# Patient Record
Sex: FEMALE | URBAN - METROPOLITAN AREA
[De-identification: names, ages, dates, MRNs, and addresses within clinical notes are randomized per-mention and may not be internally consistent; named-entity substitution may affect disease eponyms.]

---

## 2020-11-20 ENCOUNTER — APPOINTMENT (RX ONLY)
Dept: URBAN - METROPOLITAN AREA CLINIC 56 | Facility: CLINIC | Age: 30
Setting detail: DERMATOLOGY
End: 2020-11-20

## 2020-11-20 DIAGNOSIS — L74.51 PRIMARY FOCAL HYPERHIDROSIS: ICD-10-CM

## 2020-11-20 PROBLEM — L74.510 PRIMARY FOCAL HYPERHIDROSIS, AXILLA: Status: ACTIVE | Noted: 2020-11-20

## 2020-11-20 PROCEDURE — ? BOTOX HYPERHIDROSIS THERAPY

## 2020-11-20 PROCEDURE — 64650 CHEMODENERV ECCRINE GLANDS: CPT

## 2020-11-20 ASSESSMENT — LOCATION DETAILED DESCRIPTION DERM
LOCATION DETAILED: RIGHT AXILLARY VAULT
LOCATION DETAILED: LEFT AXILLARY VAULT

## 2020-11-20 ASSESSMENT — LOCATION SIMPLE DESCRIPTION DERM
LOCATION SIMPLE: LEFT AXILLARY VAULT
LOCATION SIMPLE: RIGHT AXILLARY VAULT

## 2020-11-20 ASSESSMENT — LOCATION ZONE DERM: LOCATION ZONE: AXILLAE

## 2020-11-20 NOTE — PROCEDURE: BOTOX HYPERHIDROSIS THERAPY
Soles Units: 0
Consent: Written consent obtained. Risks include but not limited to muscle weakness, bruising, swelling, temporary effect, incomplete chemical denervation of sweat glands.
Bill For J-Code?: yes
Dilution (U/0.1 Cc): 2
Lot #: [id number]
Procedure Details: 50 units per axilla
Detail Level: Detailed
Medical Necessity Clause: Botox hyperhidrosis therapy is medically necessary because
Post-Care Instructions: Patient instructed to not lie down for 4 hours and limit physical activity for 24 hours.
Axilla Units: 100
Medical Necessity Information: LCD Guidelines vary from payer to payer. Please check with your payer's policy to determine medical necessity.
Expiration Date (Month Year): 08/2023

## 2021-03-08 ENCOUNTER — APPOINTMENT (RX ONLY)
Dept: URBAN - METROPOLITAN AREA CLINIC 56 | Facility: CLINIC | Age: 31
Setting detail: DERMATOLOGY
End: 2021-03-08

## 2021-03-08 DIAGNOSIS — L74.510 PRIMARY FOCAL HYPERHIDROSIS, AXILLA: ICD-10-CM

## 2021-03-08 PROCEDURE — ? BOTOX HYPERHIDROSIS THERAPY

## 2021-03-08 PROCEDURE — 64650 CHEMODENERV ECCRINE GLANDS: CPT

## 2021-03-08 ASSESSMENT — LOCATION SIMPLE DESCRIPTION DERM
LOCATION SIMPLE: RIGHT AXILLARY VAULT
LOCATION SIMPLE: LEFT AXILLARY VAULT

## 2021-03-08 ASSESSMENT — LOCATION DETAILED DESCRIPTION DERM
LOCATION DETAILED: LEFT AXILLARY VAULT
LOCATION DETAILED: RIGHT AXILLARY VAULT

## 2021-03-08 ASSESSMENT — LOCATION ZONE DERM: LOCATION ZONE: AXILLAE

## 2021-03-08 NOTE — PROCEDURE: BOTOX HYPERHIDROSIS THERAPY
Show Groin Units: No
Detail Level: Detailed
Post-Care Instructions: Patient instructed to not lie down for 4 hours and limit physical activity for 24 hours.
Axilla Units: 100
Procedure Details: Both axillary vault areas were treated with intradermal injection of Botox.  A total of 50 units of Botox were injected in each axillary vault area for a total of 100 units given today.  The 50 units were given in 20 injection sites in each axillary vault.  The patient tolerated the procedure well with no complications.  She will avoid any heavy lifting for the rest of today.
Expiration Date (Month Year): 12/2023
Medical Necessity Clause: Botox hyperhidrosis therapy is medically necessary because
Craniofacial Units: 0
Show Axilla Units: Yes
Medical Necessity Information: LCD Guidelines vary from payer to payer. Please check with your payer's policy to determine medical necessity.
Dilution (U/0.1 Cc): 5
Consent: Written consent obtained. Risks include but not limited to muscle weakness, bruising, swelling, temporary effect, incomplete chemical denervation of sweat glands.
Lot #: [id number]

## 2021-10-21 ENCOUNTER — APPOINTMENT (RX ONLY)
Dept: URBAN - METROPOLITAN AREA CLINIC 55 | Facility: CLINIC | Age: 31
Setting detail: DERMATOLOGY
End: 2021-10-21

## 2021-10-21 DIAGNOSIS — Z41.9 ENCOUNTER FOR PROCEDURE FOR PURPOSES OTHER THAN REMEDYING HEALTH STATE, UNSPECIFIED: ICD-10-CM

## 2021-10-21 PROCEDURE — ? COSMETIC CONSULTATION: GENERAL

## 2021-10-27 ENCOUNTER — APPOINTMENT (RX ONLY)
Dept: URBAN - METROPOLITAN AREA CLINIC 55 | Facility: CLINIC | Age: 31
Setting detail: DERMATOLOGY
End: 2021-10-27

## 2021-10-27 DIAGNOSIS — Z41.9 ENCOUNTER FOR PROCEDURE FOR PURPOSES OTHER THAN REMEDYING HEALTH STATE, UNSPECIFIED: ICD-10-CM

## 2021-10-27 PROCEDURE — ? DIAGNOSIS COMMENT

## 2021-10-27 PROCEDURE — ? FILLERS

## 2021-10-27 NOTE — PROCEDURE: FILLERS
Lot #: [id number]
Decollete Filler Volume In Cc: 0
Map Statment: See Attach Map for Complete Details
Expiration Date (Month Year): 3/31/2024
Expiration Date (Month Year): 2/28/2023
Consent: Verbal consent obtained. Risks include but not limited to bruising, beading, irregular texture, ulceration, infection, allergic reaction, scar formation, incomplete augmentation, temporary nature, procedural pain.
Include Cannula Size?: 27G
Include Cannula Information In Note?: Yes
Lot #: [id number]
Anesthesia Volume In Cc: 1
Use Map Statement For Sites (Optional): No
Filler: Restylane-L
Additional Anesthesia Volume In Cc: 6
Additional Area 1 Location: lip body
Post-Care Instructions: Patient instructed to apply ice to reduce swelling.
Anesthesia Type: 2% lidocaine with epinephrine
Detail Level: Detailed

## 2021-10-27 NOTE — PROCEDURE: DIAGNOSIS COMMENT
Comment: 0.6 cc’s injected to TT area. Patient will return in 2 weeks to inject remaining 0.4 cc’s.
Detail Level: Simple
Render Risk Assessment In Note?: no

## 2021-11-11 ENCOUNTER — APPOINTMENT (RX ONLY)
Dept: URBAN - METROPOLITAN AREA CLINIC 55 | Facility: CLINIC | Age: 31
Setting detail: DERMATOLOGY
End: 2021-11-11

## 2021-11-11 DIAGNOSIS — Z41.9 ENCOUNTER FOR PROCEDURE FOR PURPOSES OTHER THAN REMEDYING HEALTH STATE, UNSPECIFIED: ICD-10-CM

## 2021-11-11 PROCEDURE — ? FILLERS

## 2021-11-11 PROCEDURE — ? DIAGNOSIS COMMENT

## 2021-11-11 NOTE — PROCEDURE: DIAGNOSIS COMMENT
Render Risk Assessment In Note?: no
Comment: Remaining filler from LOV on 10/27/21- 0.4ccs left.
Detail Level: Simple

## 2021-11-11 NOTE — PROCEDURE: FILLERS
Additional Area 2 Volume In Cc: 0
Anesthesia Type: 1% lidocaine with epinephrine
Post-Care Instructions: Patient instructed to apply ice to reduce swelling.
Include Cannula Information In Note?: No
Lot #: [id number]
Include Cannula Information In Note?: Yes
Additional Area 1 Location: lip body
Include Cannula Size?: 27G
Expiration Date (Month Year): 2/28/2024
Map Statment: See Attach Map for Complete Details
Detail Level: Detailed
Filler: Restylane-L
Lot #: [id number]
Expiration Date (Month Year): 3/31/2024
Consent: Verbal consent obtained. Risks include but not limited to bruising, beading, irregular texture, ulceration, infection, allergic reaction, scar formation, incomplete augmentation, temporary nature, procedural pain.
Lot #: [id number]
Anesthesia Volume In Cc: 1
Additional Anesthesia Volume In Cc: 6
Expiration Date (Month Year): 4/30/23

## 2022-01-19 ENCOUNTER — APPOINTMENT (RX ONLY)
Dept: URBAN - METROPOLITAN AREA CLINIC 55 | Facility: CLINIC | Age: 32
Setting detail: DERMATOLOGY
End: 2022-01-19

## 2022-01-19 DIAGNOSIS — Z41.9 ENCOUNTER FOR PROCEDURE FOR PURPOSES OTHER THAN REMEDYING HEALTH STATE, UNSPECIFIED: ICD-10-CM

## 2022-01-19 PROCEDURE — ? FILLERS

## 2022-01-19 NOTE — PROCEDURE: FILLERS
Brows Filler Volume In Cc: 0
Additional Anesthesia Volume In Cc: 6
Include Cannula Size?: 27G
Lot #: [id number]
Anesthesia Volume In Cc: 0.2
Anesthesia Type: 1% lidocaine with epinephrine
Include Cannula Information In Note?: No
Detail Level: Detailed
Include Cannula Information In Note?: Yes
Lot #: [id number]
Consent: Verbal consent obtained. Risks include but not limited to bruising, beading, irregular texture, ulceration, infection, allergic reaction, scar formation, incomplete augmentation, temporary nature, procedural pain.
Expiration Date (Month Year): 1/31/2023
Expiration Date (Month Year): 5/31/2024
Lot #: [id number]
Additional Area 1 Location: lip body
Filler: Restylane Contour
Post-Care Instructions: Patient instructed to apply ice to reduce swelling.
Expiration Date (Month Year): 3/31/2024
Map Statment: See Attach Map for Complete Details

## 2023-03-28 ENCOUNTER — APPOINTMENT (RX ONLY)
Dept: URBAN - METROPOLITAN AREA CLINIC 55 | Facility: CLINIC | Age: 33
Setting detail: DERMATOLOGY
End: 2023-03-28

## 2023-03-28 DIAGNOSIS — Z41.9 ENCOUNTER FOR PROCEDURE FOR PURPOSES OTHER THAN REMEDYING HEALTH STATE, UNSPECIFIED: ICD-10-CM

## 2023-03-28 PROCEDURE — ? INVENTORY

## 2023-03-28 PROCEDURE — ? COSMETIC CONSULTATION: GENERAL

## 2023-04-12 ENCOUNTER — APPOINTMENT (RX ONLY)
Dept: URBAN - METROPOLITAN AREA CLINIC 55 | Facility: CLINIC | Age: 33
Setting detail: DERMATOLOGY
End: 2023-04-12

## 2023-04-12 DIAGNOSIS — Z41.9 ENCOUNTER FOR PROCEDURE FOR PURPOSES OTHER THAN REMEDYING HEALTH STATE, UNSPECIFIED: ICD-10-CM

## 2023-04-12 PROCEDURE — ? NOVATHREADS

## 2023-04-12 PROCEDURE — ? INVENTORY

## 2023-04-12 NOTE — PROCEDURE: NOVATHREADS
Anesthesia Method: intra-oral block
Thread Type 1: Smooth Thread, Blunt Cannula
Second Anesthesia Volume In Cc (Optional): 0
Add Another Thread?: no
Location 1: lower eyelid
Consent was obtained.
Pre-Procedure Text: The treatment areas were cleaned with alcohol and chlorhexidine. Insertion points were made with a 26g needle
Needle Gauge 1: 27G
Post-Care Instructions (For The Patient Hand-Out): I reviewed with the patient in detail post-care instructions. Ice packs sent home with the patient.
Anesthesia Type: 2% lidocaine without epinephrine
Second Anesthesia Type: 1% lidocaine with epinephrine
Detail Level: Zone
Number Of Threads: 16
Postcare Statement Text: Post care instructions reviewed. Ice packs sent home with patient.

## 2023-07-12 ENCOUNTER — APPOINTMENT (RX ONLY)
Dept: URBAN - METROPOLITAN AREA CLINIC 55 | Facility: CLINIC | Age: 33
Setting detail: DERMATOLOGY
End: 2023-07-12

## 2023-07-12 DIAGNOSIS — Z41.9 ENCOUNTER FOR PROCEDURE FOR PURPOSES OTHER THAN REMEDYING HEALTH STATE, UNSPECIFIED: ICD-10-CM

## 2023-07-12 PROCEDURE — ? COSMETIC CONSULTATION: GENERAL

## 2023-07-12 PROCEDURE — ? INVENTORY

## 2023-07-12 PROCEDURE — ? COSMETIC FOLLOW-UP

## 2023-07-12 NOTE — PROCEDURE: COSMETIC FOLLOW-UP
Detail Level: Zone
Comments (Free Text): Post lower eyelid thread insertions photos obtained from treatment on 4/12/23. Before and after photos reviewed with patient. Patient is very pleased with her results. Plan to add 0.5 ccs of filler to inner tear trough (last TT  10/2021).

## 2023-07-18 ENCOUNTER — APPOINTMENT (RX ONLY)
Dept: URBAN - METROPOLITAN AREA CLINIC 55 | Facility: CLINIC | Age: 33
Setting detail: DERMATOLOGY
End: 2023-07-18

## 2023-07-18 DIAGNOSIS — Z41.9 ENCOUNTER FOR PROCEDURE FOR PURPOSES OTHER THAN REMEDYING HEALTH STATE, UNSPECIFIED: ICD-10-CM

## 2023-07-18 PROCEDURE — ? FILLERS

## 2023-07-18 PROCEDURE — ? INVENTORY

## 2023-07-18 NOTE — PROCEDURE: FILLERS
Mid Face Filler Volume In Cc: 0
Anesthesia Volume In Cc: 0.5
Include Cannula Information In Note?: Yes
Include Cannula Size?: 27G
Include Cannula Information In Note?: No
Additional Anesthesia Volume In Cc: 6
Detail Level: Detailed
Filler Comments: 0.5 ccs
Filler: Restylane-L
Consent: Written consent obtained. Risks include but not limited to bruising, beading, irregular texture, ulceration, infection, allergic reaction, scar formation, incomplete augmentation, temporary nature, and procedural pain.
Map Statment: See Attach Map for Complete Details
Post-Care Instructions: After the procedure, patient instructed to apply ice to reduce swelling.
Anesthesia Type: 1% lidocaine with epinephrine

## 2023-09-15 ENCOUNTER — APPOINTMENT (RX ONLY)
Dept: URBAN - METROPOLITAN AREA CLINIC 55 | Facility: CLINIC | Age: 33
Setting detail: DERMATOLOGY
End: 2023-09-15

## 2023-09-15 DIAGNOSIS — Z41.9 ENCOUNTER FOR PROCEDURE FOR PURPOSES OTHER THAN REMEDYING HEALTH STATE, UNSPECIFIED: ICD-10-CM

## 2023-09-15 PROCEDURE — ? IN-HOUSE DISPENSING PHARMACY

## 2023-09-15 PROCEDURE — ? INVENTORY

## 2023-09-15 NOTE — PROCEDURE: IN-HOUSE DISPENSING PHARMACY
Product 72 Unit Type: mg
Product 8 Price/Unit (In Dollars): 0
Product 3 Refills: 11
Product 5 Unit Type: ml
Detail Level: Zone
Product 6 Application Directions: Apply nightly or as directed. Use SPF daily.
Product 2 Amount/Unit (Numbers Only): 30
Render Refills If Set To 0: Yes
Send Charges To Patient Encounter: No
Name Of Product 4: Hydroquinone 8% Emulsion
Product 7 Units Dispensed: 1
Name Of Product 1: Anti-Aging Brightening Cream
Name Of Product 2: Dapsone / Spironolactone Gel
Product 1 Refills: 2
Product 2 Application Directions: Apply nightly or as directed.
Name Of Product 3: Acne Triple Combination Gel
Product 7 Application Directions: Apply only as directed
Name Of Product 5: Hydrating Tretinoin 0.025% Cream
Name Of Product 6: Rosacea Triple Combination Gel
Name Of Product 7: Lidocaine 23% / Tetracaine 7% Cream

## 2023-10-10 ENCOUNTER — APPOINTMENT (RX ONLY)
Dept: URBAN - METROPOLITAN AREA CLINIC 55 | Facility: CLINIC | Age: 33
Setting detail: DERMATOLOGY
End: 2023-10-10

## 2023-10-10 DIAGNOSIS — Z41.9 ENCOUNTER FOR PROCEDURE FOR PURPOSES OTHER THAN REMEDYING HEALTH STATE, UNSPECIFIED: ICD-10-CM

## 2023-10-10 PROCEDURE — ? INVENTORY

## 2023-10-10 PROCEDURE — ? ICON IPL

## 2023-10-10 ASSESSMENT — LOCATION ZONE DERM: LOCATION ZONE: FACE

## 2023-10-10 ASSESSMENT — LOCATION DETAILED DESCRIPTION DERM
LOCATION DETAILED: LEFT MEDIAL FOREHEAD
LOCATION DETAILED: LEFT CENTRAL MALAR CHEEK
LOCATION DETAILED: LEFT CHIN
LOCATION DETAILED: RIGHT CENTRAL MALAR CHEEK

## 2023-10-10 ASSESSMENT — LOCATION SIMPLE DESCRIPTION DERM
LOCATION SIMPLE: LEFT CHEEK
LOCATION SIMPLE: LEFT FOREHEAD
LOCATION SIMPLE: RIGHT CHEEK
LOCATION SIMPLE: CHIN

## 2023-10-10 NOTE — PROCEDURE: ICON IPL
Consent obtained, risks reviewed.
Contact: Light
Detail Level: Zone
Treatment Number (Optional): 1
Pulse Duration (Optional- Include Units): 15 ms
Pulse Duration (Optional- Include Units): 20 ms
Post-Care Instructions: I reviewed with the patient in detail post-care instructions. Patient should avoid sun exposure and to wear sun protection until treated areas are fully healed.
Length Of Topical Anesthesia Application (Optional): 60 minutes
External Cooling Fan Speed: 5
Energy (Optional- Include Units): 36 J/cm2
Anesthesia Volume In Cc: 0
Energy (Optional- Include Units): 44 J/cm2
Energy (Optional- Include Units): 32 J/cm2
Topical Anesthesia?: 23% lidocaine, 7% tetracaine
Pre-Procedure Text: After consent was obtained, the treatment areas were cleansed and treated using the parameters mentioned above.

## 2023-10-24 ENCOUNTER — APPOINTMENT (RX ONLY)
Dept: URBAN - METROPOLITAN AREA CLINIC 55 | Facility: CLINIC | Age: 33
Setting detail: DERMATOLOGY
End: 2023-10-24

## 2023-10-24 DIAGNOSIS — Z41.9 ENCOUNTER FOR PROCEDURE FOR PURPOSES OTHER THAN REMEDYING HEALTH STATE, UNSPECIFIED: ICD-10-CM

## 2023-10-24 PROCEDURE — ? ICON IPL

## 2023-10-24 PROCEDURE — ? INVENTORY

## 2023-10-24 ASSESSMENT — LOCATION ZONE DERM
LOCATION ZONE: FACE
LOCATION ZONE: NOSE

## 2023-10-24 ASSESSMENT — LOCATION SIMPLE DESCRIPTION DERM
LOCATION SIMPLE: SUPERIOR FOREHEAD
LOCATION SIMPLE: NOSE
LOCATION SIMPLE: LEFT CHEEK
LOCATION SIMPLE: RIGHT CHEEK
LOCATION SIMPLE: CHIN

## 2023-10-24 ASSESSMENT — LOCATION DETAILED DESCRIPTION DERM
LOCATION DETAILED: LEFT CHIN
LOCATION DETAILED: LEFT CENTRAL MALAR CHEEK
LOCATION DETAILED: SUPERIOR MID FOREHEAD
LOCATION DETAILED: NASAL DORSUM
LOCATION DETAILED: RIGHT CENTRAL MALAR CHEEK

## 2023-10-24 NOTE — PROCEDURE: ICON IPL
Pulse Duration (Optional- Include Units): 20 ms
Contact: Light
Length Of Topical Anesthesia Application (Optional): 60 minutes
Energy (Optional- Include Units): 32 J/cm2
Detail Level: Zone
Passes: 1
External Cooling Fan Speed: 5
Pre-Procedure Text: After consent was obtained, the treatment areas were cleansed and treated using the parameters mentioned above.
Consent obtained, risks reviewed.
Pulse Duration (Optional- Include Units): 15 ms
Topical Anesthesia?: 23% lidocaine, 7% tetracaine
Anesthesia Volume In Cc: 0
Treatment Number (Optional): 2
Energy (Optional- Include Units): 48 J/cm2
Post-Care Instructions: I reviewed with the patient in detail post-care instructions. Patient should avoid sun exposure and to wear sun protection until treated areas are fully healed.
Energy (Optional- Include Units): 36 J/cm2

## 2023-11-10 ENCOUNTER — APPOINTMENT (RX ONLY)
Dept: URBAN - METROPOLITAN AREA CLINIC 55 | Facility: CLINIC | Age: 33
Setting detail: DERMATOLOGY
End: 2023-11-10

## 2023-11-10 DIAGNOSIS — Z41.9 ENCOUNTER FOR PROCEDURE FOR PURPOSES OTHER THAN REMEDYING HEALTH STATE, UNSPECIFIED: ICD-10-CM

## 2023-11-10 PROCEDURE — ? ICON IPL

## 2023-11-10 PROCEDURE — ? INVENTORY

## 2023-11-10 ASSESSMENT — LOCATION ZONE DERM
LOCATION ZONE: NOSE
LOCATION ZONE: LIP
LOCATION ZONE: FACE

## 2023-11-10 ASSESSMENT — LOCATION DETAILED DESCRIPTION DERM
LOCATION DETAILED: LEFT CENTRAL MALAR CHEEK
LOCATION DETAILED: RIGHT CHIN
LOCATION DETAILED: RIGHT CENTRAL MALAR CHEEK
LOCATION DETAILED: NASAL DORSUM
LOCATION DETAILED: PHILTRUM

## 2023-11-10 ASSESSMENT — LOCATION SIMPLE DESCRIPTION DERM
LOCATION SIMPLE: UPPER LIP
LOCATION SIMPLE: LEFT CHEEK
LOCATION SIMPLE: RIGHT CHEEK
LOCATION SIMPLE: NOSE
LOCATION SIMPLE: CHIN

## 2023-11-10 NOTE — PROCEDURE: ICON IPL
Consent obtained, risks reviewed.
Energy (Optional- Include Units): 36 J/cm2
External Cooling Fan Speed: 5
Treatment Number (Optional): 3
Pulse Duration (Optional- Include Units): 15 ms
Pulse Duration (Optional- Include Units): 20 ms
Detail Level: Zone
Energy (Optional- Include Units): 32 J/cm2
Anesthesia Volume In Cc: 0
Post-Care Instructions: I reviewed with the patient in detail post-care instructions. Patient should avoid sun exposure and to wear sun protection until treated areas are fully healed.
Contact: Light
Passes: 1
Topical Anesthesia?: 23% lidocaine, 7% tetracaine
Length Of Topical Anesthesia Application (Optional): 60 minutes
Pre-Procedure Text: After consent was obtained, the treatment areas were cleansed and treated using the parameters mentioned above.

## 2024-07-09 ENCOUNTER — APPOINTMENT (RX ONLY)
Dept: URBAN - METROPOLITAN AREA CLINIC 55 | Facility: CLINIC | Age: 34
Setting detail: DERMATOLOGY
End: 2024-07-09

## 2024-07-09 DIAGNOSIS — Z41.9 ENCOUNTER FOR PROCEDURE FOR PURPOSES OTHER THAN REMEDYING HEALTH STATE, UNSPECIFIED: ICD-10-CM

## 2024-07-09 PROCEDURE — ? INVENTORY

## 2024-07-09 PROCEDURE — ? FILLERS

## 2024-07-09 NOTE — PROCEDURE: FILLERS
Decollete Filler Volume In Cc: 0
Include Cannula Information In Note?: No
Additional Anesthesia Volume In Cc: 6
Include Cannula Size?: 27G
Include Cannula Size?: 25G
Map Statment: See Attach Map for Complete Details
Filler Comments: 0.5 cc’s
Filler: Restylane Kysse
Consent: Verbal consent obtained, risks reviewed.
Post-Care Instructions: After the procedure, patient instructed to apply ice to reduce swelling.
Include Cannula Information In Note?: Yes
Anesthesia Type: 1% lidocaine with epinephrine
Anesthesia Volume In Cc: 0.5
Detail Level: Detailed

## 2024-07-23 ENCOUNTER — APPOINTMENT (RX ONLY)
Dept: URBAN - METROPOLITAN AREA CLINIC 55 | Facility: CLINIC | Age: 34
Setting detail: DERMATOLOGY
End: 2024-07-23

## 2024-07-23 DIAGNOSIS — Z41.9 ENCOUNTER FOR PROCEDURE FOR PURPOSES OTHER THAN REMEDYING HEALTH STATE, UNSPECIFIED: ICD-10-CM

## 2024-07-23 PROCEDURE — ? COSMETIC FOLLOW-UP

## 2024-07-23 PROCEDURE — ? INVENTORY

## 2024-07-23 NOTE — PROCEDURE: COSMETIC FOLLOW-UP
Comments (Free Text): Finish syringe of filler from 1st treatment on 7/9/24.\\nExp: 9/30/25\\nLot#: [id number]
Detail Level: Zone

## 2025-03-04 ENCOUNTER — APPOINTMENT (OUTPATIENT)
Dept: URBAN - METROPOLITAN AREA CLINIC 55 | Facility: CLINIC | Age: 35
Setting detail: DERMATOLOGY
End: 2025-03-04

## 2025-03-04 DIAGNOSIS — Z41.9 ENCOUNTER FOR PROCEDURE FOR PURPOSES OTHER THAN REMEDYING HEALTH STATE, UNSPECIFIED: ICD-10-CM

## 2025-03-04 PROCEDURE — ? INVENTORY

## 2025-03-04 PROCEDURE — ? HYALURONIDASE INJECTION

## 2025-03-04 PROCEDURE — ? NOVATHREADS

## 2025-03-04 ASSESSMENT — LOCATION DETAILED DESCRIPTION DERM: LOCATION DETAILED: RIGHT MEDIAL MALAR CHEEK

## 2025-03-04 ASSESSMENT — LOCATION SIMPLE DESCRIPTION DERM: LOCATION SIMPLE: RIGHT CHEEK

## 2025-03-04 ASSESSMENT — LOCATION ZONE DERM: LOCATION ZONE: FACE

## 2025-03-04 NOTE — PROCEDURE: HYALURONIDASE INJECTION
Detail Level: Detailed
Hyaluronidase Preparation: hyaluronidase
Consent obtained. The risks of contour defects and possible need of further treatment with charge were reviewed with the patient prior to the injection.
Notes: There was area of fullness with Tahira effect to the right tear trough.
Total Volume (Ccs): 0.1

## 2025-03-04 NOTE — PROCEDURE: NOVATHREADS
Detail Level: Zone
Add Another Thread?: yes
Third Anesthesia Volume In Cc (Optional): 0
Add Another Thread?: no
Number Of Threads: 1
Consent was obtained.  The risks of the procedure were discussed with the patient.
Thread Size 1: 1.5
Pre-Procedure Text: The treatment areas were cleaned with chlorhexidine and then prepped with isopropyl alcohol . Insertion sites anesthetized with 1% lidocaine with epi 1:100,000.
Location 2: twist thread, blunted cannula
Anesthesia Type: 1% lidocaine with 1:100,000 epinephrine and a 1:5 solution of 8.4% sodium bicarbonate
Post-Care Instructions (For The Patient Hand-Out): Patient should apply ice packs to minimize swelling. Post care instruction were reviewed with patient and handouts given.
Procedure Text: An 18 gauge needle was used to create the insertions points and the NovaThreads were inserted subcutaneously as shown on diagram
Thread Type 1: Smooth Thread, Blunt Cannula
Second Anesthesia Type: 1% lidocaine with epinephrine
Location 1: tear trough
Postcare Statement Text: There were no complications and the patient was given postcare instructions and ice packs.
Anesthesia Volume In Cc (Optional): 0.2
Needle Gauge 1: 29G